# Patient Record
Sex: MALE | Race: WHITE | NOT HISPANIC OR LATINO | Employment: FULL TIME | URBAN - METROPOLITAN AREA
[De-identification: names, ages, dates, MRNs, and addresses within clinical notes are randomized per-mention and may not be internally consistent; named-entity substitution may affect disease eponyms.]

---

## 2019-11-15 ENCOUNTER — TELEPHONE (OUTPATIENT)
Dept: SCHEDULING | Facility: IMAGING CENTER | Age: 49
End: 2019-11-15

## 2019-12-11 ENCOUNTER — OFFICE VISIT (OUTPATIENT)
Dept: MEDICAL GROUP | Facility: MEDICAL CENTER | Age: 49
End: 2019-12-11
Payer: COMMERCIAL

## 2019-12-11 VITALS
TEMPERATURE: 98.3 F | WEIGHT: 315 LBS | RESPIRATION RATE: 14 BRPM | SYSTOLIC BLOOD PRESSURE: 150 MMHG | DIASTOLIC BLOOD PRESSURE: 102 MMHG | HEIGHT: 78 IN | HEART RATE: 86 BPM | OXYGEN SATURATION: 94 % | BODY MASS INDEX: 36.45 KG/M2

## 2019-12-11 DIAGNOSIS — G47.33 OBSTRUCTIVE SLEEP APNEA SYNDROME: ICD-10-CM

## 2019-12-11 DIAGNOSIS — I10 ESSENTIAL HYPERTENSION: ICD-10-CM

## 2019-12-11 DIAGNOSIS — Z11.4 SCREENING FOR HIV (HUMAN IMMUNODEFICIENCY VIRUS): ICD-10-CM

## 2019-12-11 PROCEDURE — 99203 OFFICE O/P NEW LOW 30 MIN: CPT | Performed by: FAMILY MEDICINE

## 2019-12-11 RX ORDER — LISINOPRIL 10 MG/1
10 TABLET ORAL DAILY
Qty: 30 TAB | Refills: 3 | Status: SHIPPED | OUTPATIENT
Start: 2019-12-11

## 2019-12-11 SDOH — HEALTH STABILITY: MENTAL HEALTH: HOW MANY STANDARD DRINKS CONTAINING ALCOHOL DO YOU HAVE ON A TYPICAL DAY?: 1 OR 2

## 2019-12-11 SDOH — HEALTH STABILITY: MENTAL HEALTH: HOW OFTEN DO YOU HAVE A DRINK CONTAINING ALCOHOL?: 2-3 TIMES A WEEK

## 2019-12-11 ASSESSMENT — PATIENT HEALTH QUESTIONNAIRE - PHQ9: CLINICAL INTERPRETATION OF PHQ2 SCORE: 0

## 2019-12-11 NOTE — ASSESSMENT & PLAN NOTE
This is a chronic problem.  The patient is currently exercising at least 4-5 hours per week.  He is trying to improve his diet however admits that he is eating fast food frequently.

## 2019-12-11 NOTE — ASSESSMENT & PLAN NOTE
This is a chronic problem.  The patient reports he was diagnosed about 2 years ago and recommended to wear CPAP however he reports he cannot tolerate a CPAP.  He is not interested in following up with sleep medicine at this time.

## 2019-12-11 NOTE — PROGRESS NOTES
Subjective:     CC:  Diagnoses of Essential hypertension, Obstructive sleep apnea syndrome, BMI 37.0-37.9, adult    HISTORY OF THE PRESENT ILLNESS: Patient is a 49 y.o. male. He is here today to establish care and discuss the following:    Prior PCP: Dr. Bryan Murillo    HTN (hypertension)  This is a chronic problem.  The patient states he was on blood pressure medication about 2 years ago however stopped the medication as his blood pressure apparently normalized.  He is unsure which medication he was previously taking.  He does not have a home blood pressure cuff and reports no recent readings.  He denies headaches, vision changes, chest pain, shortness of breath.  He is working on diet, exercise, and weight loss.    Obstructive sleep apnea syndrome  This is a chronic problem.  The patient reports he was diagnosed about 2 years ago and recommended to wear CPAP however he reports he cannot tolerate a CPAP.  He is not interested in following up with sleep medicine at this time.    BMI 37.0-37.9, adult  This is a chronic problem.  The patient is currently exercising at least 4-5 hours per week.  He is trying to improve his diet however admits that he is eating fast food frequently.      Allergies: Patient has no known allergies.    Current Outpatient Medications Ordered in Epic   Medication Sig Dispense Refill   • lisinopril (PRINIVIL) 10 MG Tab Take 1 Tab by mouth every day. 30 Tab 3     No current Epic-ordered facility-administered medications on file.        Past Medical History:   Diagnosis Date   • Hypertension        History reviewed. No pertinent surgical history.    Social History     Tobacco Use   • Smoking status: Never Smoker   • Smokeless tobacco: Never Used   Substance Use Topics   • Alcohol use: Yes     Frequency: 2-3 times a week     Drinks per session: 1 or 2   • Drug use: Never       Social History     Patient does not qualify to have social determinant information on file (likely too young).   Social  "History Narrative   • Not on file       Family History   Problem Relation Age of Onset   • No Known Problems Mother    • No Known Problems Father    • Hypertension Maternal Grandfather          ROS:   Gen: no fevers/chills, no changes in weight  Eyes: no changes in vision  ENT: no sore throat or nasal congestion  Pulm: no sob, no cough  CV: no chest pain  GI: no nausea/vomiting, no diarrhea or constipation  : no dysuria  MSk: no myalgias  Skin: no rash  Neuro: no headaches, no numbness/tingling  Heme/Lymph: no easy bruising      Objective:     Exam: /102 (BP Location: Left arm)   Pulse 86   Temp 36.8 °C (98.3 °F) (Temporal)   Resp 14   Ht 1.981 m (6' 6\")   Wt (!) 148.3 kg (327 lb)   SpO2 94%  Body mass index is 37.79 kg/m².    General: Well-developed, well-nourished. Appears stated age.  HEENT: Normocephalic. + right subconjunctival hemorrhage  Pupils equal and reactive to light. Ears normal shape and contour, canals are clear bilaterally, tympanic membranes pearly, oropharynx is clear without erythema, edema or exudates.   Neck: Supple; no obvious thyromegaly or nodules appreciated  Pulmonary: Clear to ausculation bilaterally.  No increased work of breathing. No rales, ronchi, or wheezing.  Cardiovascular: Regular rate and rhythm without murmur.  Neurologic: Grossly nonfocal.  Lymph: No cervical or supraclavicular lymphadenopathy  Skin: Warm and dry.  No obvious lesions.  Psych: Euthymic affect. Alert and oriented x 3. Judgment and insight is normal.      Assessment & Plan:   49 y.o. male with the following -    1. Essential hypertension  This is a chronic problem.  Patient stopped antihypertensive medication about 2 years ago as he reports his blood pressure normalized.  Blood pressure 150/102 today.    - start lisinopril 10mg daily  - continue improving diet, daily exercise, and weight loss (referral to HIP offered, patient declined)  - DASH diet handout provided  - keep home BP log, bring to next " appointment  - Lipid Profile; Future  - MICROALBUMIN CREAT RATIO URINE; Future  - HEMOGLOBIN A1C; Future  - Comp Metabolic Panel; Future  - CBC WITH DIFFERENTIAL; Future    2. Obstructive sleep apnea syndrome  This is a chronic problem.  Patient was diagnosed 2 years ago and recommended to wear CPAP which she does not tolerate.  He declines further work-up or intervention at this time.    3. BMI 37.0-37.9, adult  This is a chronic problem.  The patient is currently working on improving his diet.  He exercises 4 to 5 hours a week and is trying to lose 60 pounds.  He declines referral to the health improvement program.  - DASH diet handout provided  - continue improving diet, daily exercise, and weight loss  - Lipid Profile; Future  - MICROALBUMIN CREAT RATIO URINE; Future  - HEMOGLOBIN A1C; Future  - Comp Metabolic Panel; Future  - CBC WITH DIFFERENTIAL; Future    4. Screening for HIV (human immunodeficiency virus)  - HIV AG/AB COMBO ASSAY SCREENING; Future      Return in about 4 weeks (around 1/8/2020).    Please note this dictation was created using voice recognition software. I have made every reasonable attempt to correct obvious errors, but I expect there may be errors of grammar, and possibly content, that I did not discover before finalizing the note.

## 2019-12-11 NOTE — ASSESSMENT & PLAN NOTE
This is a chronic problem.  The patient states he was on blood pressure medication about 2 years ago however stopped the medication as his blood pressure apparently normalized.  He is unsure which medication he was previously taking.  He does not have a home blood pressure cuff and reports no recent readings.  He denies headaches, vision changes, chest pain, shortness of breath.  He is working on diet, exercise, and weight loss.

## 2020-02-02 ENCOUNTER — OFFICE VISIT (OUTPATIENT)
Dept: URGENT CARE | Facility: PHYSICIAN GROUP | Age: 50
End: 2020-02-02

## 2020-02-02 VITALS
OXYGEN SATURATION: 94 % | HEART RATE: 74 BPM | TEMPERATURE: 98.6 F | HEIGHT: 78 IN | RESPIRATION RATE: 18 BRPM | DIASTOLIC BLOOD PRESSURE: 86 MMHG | SYSTOLIC BLOOD PRESSURE: 146 MMHG | WEIGHT: 315 LBS | BODY MASS INDEX: 36.45 KG/M2

## 2020-02-02 DIAGNOSIS — R60.0 BILATERAL EDEMA OF LOWER EXTREMITY: ICD-10-CM

## 2020-02-02 DIAGNOSIS — I87.2 VENOUS STASIS DERMATITIS OF BOTH LOWER EXTREMITIES: ICD-10-CM

## 2020-02-02 PROCEDURE — 99213 OFFICE O/P EST LOW 20 MIN: CPT | Performed by: FAMILY MEDICINE

## 2020-02-02 ASSESSMENT — ENCOUNTER SYMPTOMS
CHILLS: 0
SHORTNESS OF BREATH: 0
VOMITING: 0
COUGH: 0
FEVER: 0
MYALGIAS: 0
LEG SWELLING: 1
NAUSEA: 0
EYE PAIN: 0
FATIGUE: 0
DIZZINESS: 0
SORE THROAT: 0

## 2020-02-02 NOTE — PROGRESS NOTES
"Subjective:   Jens Cruz is a 49 y.o. male who presents for Leg Swelling (bilateral leg swelling, rash )        49-year-old male presents to the urgent care with chief complaint of bilateral leg swelling and leg rash.  This patient is active as a  and is frequently on the feet throughout the workweek.  Patient is noted intermittent swelling in the legs this episode has become persistent associated with the rash.    Leg Swelling   This is a new problem. The current episode started in the past 7 days. Pertinent negatives include no chest pain, chills, congestion, coughing, fatigue, fever, myalgias, nausea, rash, sore throat or vomiting. Treatments tried: lower extremity elevation.     Review of Systems   Constitutional: Negative for chills, fatigue and fever.   HENT: Negative for congestion and sore throat.    Eyes: Negative for pain.   Respiratory: Negative for cough and shortness of breath.    Cardiovascular: Positive for leg swelling. Negative for chest pain.   Gastrointestinal: Negative for nausea and vomiting.   Genitourinary: Negative for hematuria.   Musculoskeletal: Negative for myalgias.   Skin: Negative for rash.   Neurological: Negative for dizziness.     No Known Allergies   Objective:   /86   Pulse 74   Temp 37 °C (98.6 °F)   Resp 18   Ht 1.981 m (6' 6\")   Wt (!) 151.5 kg (334 lb)   SpO2 94%   BMI 38.60 kg/m²   Physical Exam  Vitals signs and nursing note reviewed.   Constitutional:       General: He is not in acute distress.     Appearance: He is well-developed.   HENT:      Head: Normocephalic and atraumatic.      Right Ear: External ear normal.      Left Ear: External ear normal.      Nose: Nose normal.      Mouth/Throat:      Mouth: Mucous membranes are moist.   Eyes:      Conjunctiva/sclera: Conjunctivae normal.      Pupils: Pupils are equal, round, and reactive to light.   Cardiovascular:      Rate and Rhythm: Normal rate and regular rhythm.      Pulses:    "        Dorsalis pedis pulses are 2+ on the right side and 2+ on the left side.        Posterior tibial pulses are 2+ on the right side and 2+ on the left side.      Heart sounds: No murmur.   Pulmonary:      Effort: Pulmonary effort is normal. No respiratory distress.      Breath sounds: Normal breath sounds.   Abdominal:      General: There is no distension.      Palpations: Abdomen is soft.      Tenderness: There is no tenderness.   Musculoskeletal: Normal range of motion.      Right shoulder: He exhibits swelling. He exhibits normal range of motion, no tenderness, normal pulse (negative Homans sign) and normal strength.      Right lower leg: Edema present.      Left lower leg: Edema present.   Skin:     General: Skin is warm and dry.      Findings: Rash (Bilateral lower extremity rash, erythematous, macular, no vesicles, consistent with stasis dermatitis) present. No erythema or petechiae.   Neurological:      General: No focal deficit present.      Mental Status: He is alert and oriented to person, place, and time. Mental status is at baseline.      Gait: Gait (gait at baseline) normal.   Psychiatric:         Judgment: Judgment normal.     Medical decision-making/course: The patient remained afebrile, hemodynamically and neurologically stable with no evidence of respiratory compromise throughout the urgent care course.  There was no immediate clinical indication for the necessity of emergency department evaluation or inpatient admission and the patient requested a trial of outpatient management.          Assessment/Plan:   1. Venous stasis dermatitis of both lower extremities  - Elastic Bandages & Supports (MEDICAL COMPRESSION STOCKINGS) Misc; 1 Units by Does not apply route every day.  Dispense: 2 Each; Refill: 0    2. Bilateral edema of lower extremity      Discussed close monitoring, return precautions, and supportive measures including maintaining adequate fluid hydration and caloric intake, relative rest  and OTC symptom management including acetaminophen as needed for pain and/or fever.    Differential diagnosis, natural history, supportive care, and indications for immediate follow-up discussed.     Advised the patient to follow-up with the primary care physician for recheck, reevaluation, and consideration of further management.

## 2020-02-02 NOTE — PATIENT INSTRUCTIONS
Venous Stasis or Chronic Venous Insufficiency  Chronic venous insufficiency, also called venous stasis, is a condition that affects the veins in the legs. The condition prevents blood from being pumped through these veins effectively. Blood may no longer be pumped effectively from the legs back to the heart. This condition can range from mild to severe. With proper treatment, you should be able to continue with an active life.  CAUSES   Chronic venous insufficiency occurs when the vein walls become stretched, weakened, or damaged or when valves within the vein are damaged. Some common causes of this include:  · High blood pressure inside the veins (venous hypertension).  · Increased blood pressure in the leg veins from long periods of sitting or standing.  · A blood clot that blocks blood flow in a vein (deep vein thrombosis).  · Inflammation of a superficial vein (phlebitis) that causes a blood clot to form.  RISK FACTORS  Various things can make you more likely to develop chronic venous insufficiency, including:  · Family history of this condition.  · Obesity.  · Pregnancy.  · Sedentary lifestyle.  · Smoking.  · Jobs requiring long periods of standing or sitting in one place.  · Being a certain age. Women in their 40s and 50s and men in their 70s are more likely to develop this condition.  SIGNS AND SYMPTOMS   Symptoms may include:   · Varicose veins.  · Skin breakdown or ulcers.  · Reddened or discolored skin on the leg.  · Brown, smooth, tight, and painful skin just above the ankle, usually on the inside surface (lipodermatosclerosis).  · Swelling.  DIAGNOSIS   To diagnose this condition, your health care provider will take a medical history and do a physical exam. The following tests may be ordered to confirm the diagnosis:  · Duplex ultrasound--A procedure that produces a picture of a blood vessel and nearby organs and also provides information on blood flow through the blood vessel.  · Plethysmography--A  "procedure that tests blood flow.  · A venogram, or venography--A procedure used to look at the veins using X-ray and dye.  TREATMENT  The goals of treatment are to help you return to an active life and to minimize pain or disability. Treatment will depend on the severity of the condition. Medical procedures may be needed for severe cases. Treatment options may include:   · Use of compression stockings. These can help with symptoms and lower the chances of the problem getting worse, but they do not cure the problem.  · Sclerotherapy--A procedure involving an injection of a material that \"dissolves\" the damaged veins. Other veins in the network of blood vessels take over the function of the damaged veins.  · Surgery to remove the vein or cut off blood flow through the vein (vein stripping or laser ablation surgery).  · Surgery to repair a valve.  HOME CARE INSTRUCTIONS   · Wear compression stockings as directed by your health care provider.  · Only take over-the-counter or prescription medicines for pain, discomfort, or fever as directed by your health care provider.  · Follow up with your health care provider as directed.  SEEK MEDICAL CARE IF:   · You have redness, swelling, or increasing pain in the affected area.  · You see a red streak or line that extends up or down from the affected area.  · You have a breakdown or loss of skin in the affected area, even if the breakdown is small.  · You have an injury to the affected area.  SEEK IMMEDIATE MEDICAL CARE IF:   · You have an injury and open wound in the affected area.  · Your pain is severe and does not improve with medicine.  · You have sudden numbness or weakness in the foot or ankle below the affected area, or you have trouble moving your foot or ankle.  · You have a fever or persistent symptoms for more than 2-3 days.  · You have a fever and your symptoms suddenly get worse.  MAKE SURE YOU:   · Understand these instructions.  · Will watch your " condition.  · Will get help right away if you are not doing well or get worse.  This information is not intended to replace advice given to you by your health care provider. Make sure you discuss any questions you have with your health care provider.  Document Released: 04/22/2008 Document Revised: 10/08/2014 Document Reviewed: 08/25/2014  [employer] Patient Education © 2017 [employer]